# Patient Record
Sex: FEMALE | Race: WHITE | Employment: FULL TIME | URBAN - METROPOLITAN AREA
[De-identification: names, ages, dates, MRNs, and addresses within clinical notes are randomized per-mention and may not be internally consistent; named-entity substitution may affect disease eponyms.]

---

## 2022-05-29 ENCOUNTER — HOSPITAL ENCOUNTER (EMERGENCY)
Age: 27
Discharge: HOME | End: 2022-05-29
Attending: EMERGENCY MEDICINE
Payer: COMMERCIAL

## 2022-05-29 ENCOUNTER — APPOINTMENT (OUTPATIENT)
Dept: CT IMAGING | Age: 27
End: 2022-05-29
Payer: COMMERCIAL

## 2022-05-29 VITALS
SYSTOLIC BLOOD PRESSURE: 119 MMHG | HEIGHT: 66 IN | OXYGEN SATURATION: 100 % | RESPIRATION RATE: 18 BRPM | BODY MASS INDEX: 34.65 KG/M2 | TEMPERATURE: 98 F | HEART RATE: 64 BPM | WEIGHT: 215.61 LBS | DIASTOLIC BLOOD PRESSURE: 79 MMHG

## 2022-05-29 DIAGNOSIS — M54.50 ACUTE EXACERBATION OF CHRONIC LOW BACK PAIN: Primary | ICD-10-CM

## 2022-05-29 DIAGNOSIS — G89.29 ACUTE EXACERBATION OF CHRONIC LOW BACK PAIN: Primary | ICD-10-CM

## 2022-05-29 LAB
BACTERIA: ABNORMAL /HPF
BILIRUBIN URINE: NEGATIVE
BLOOD, URINE: ABNORMAL
CLARITY: ABNORMAL
COLOR: YELLOW
EPITHELIAL CELLS, UA: 9 /HPF (ref 0–5)
GLUCOSE URINE: NEGATIVE MG/DL
HCG(URINE) PREGNANCY TEST: NEGATIVE
HYALINE CASTS: 1 /LPF (ref 0–8)
KETONES, URINE: 15 MG/DL
LEUKOCYTE ESTERASE, URINE: NEGATIVE
MICROSCOPIC EXAMINATION: YES
NITRITE, URINE: NEGATIVE
PH UA: 5.5 (ref 5–8)
PROTEIN UA: NEGATIVE MG/DL
RBC UA: 14 /HPF (ref 0–4)
SPECIFIC GRAVITY UA: 1.03 (ref 1–1.03)
URINE REFLEX TO CULTURE: ABNORMAL
URINE TYPE: ABNORMAL
UROBILINOGEN, URINE: 1 E.U./DL
WBC UA: 3 /HPF (ref 0–5)

## 2022-05-29 PROCEDURE — 96372 THER/PROPH/DIAG INJ SC/IM: CPT

## 2022-05-29 PROCEDURE — 6360000002 HC RX W HCPCS: Performed by: EMERGENCY MEDICINE

## 2022-05-29 PROCEDURE — 84703 CHORIONIC GONADOTROPIN ASSAY: CPT

## 2022-05-29 PROCEDURE — 72131 CT LUMBAR SPINE W/O DYE: CPT

## 2022-05-29 PROCEDURE — 81001 URINALYSIS AUTO W/SCOPE: CPT

## 2022-05-29 PROCEDURE — 99284 EMERGENCY DEPT VISIT MOD MDM: CPT

## 2022-05-29 RX ORDER — KETOROLAC TROMETHAMINE 30 MG/ML
30 INJECTION, SOLUTION INTRAMUSCULAR; INTRAVENOUS ONCE
Status: COMPLETED | OUTPATIENT
Start: 2022-05-29 | End: 2022-05-29

## 2022-05-29 RX ORDER — METHYLPREDNISOLONE 4 MG/1
TABLET ORAL
Qty: 1 KIT | Refills: 0 | Status: SHIPPED | OUTPATIENT
Start: 2022-05-29

## 2022-05-29 RX ORDER — KETOROLAC TROMETHAMINE 10 MG/1
10 TABLET, FILM COATED ORAL EVERY 6 HOURS PRN
Qty: 14 TABLET | Refills: 0 | Status: SHIPPED | OUTPATIENT
Start: 2022-05-29

## 2022-05-29 RX ADMIN — KETOROLAC TROMETHAMINE 30 MG: 30 INJECTION, SOLUTION INTRAMUSCULAR at 06:29

## 2022-05-29 ASSESSMENT — PAIN SCALES - GENERAL: PAINLEVEL_OUTOF10: 8

## 2022-05-29 ASSESSMENT — PAIN DESCRIPTION - LOCATION: LOCATION: BACK

## 2022-05-29 ASSESSMENT — ENCOUNTER SYMPTOMS
ABDOMINAL PAIN: 0
EYE ITCHING: 0
EYE DISCHARGE: 0
COLOR CHANGE: 0
COUGH: 0
CONSTIPATION: 0
VOMITING: 0
SHORTNESS OF BREATH: 0
BACK PAIN: 1

## 2022-05-29 ASSESSMENT — PAIN - FUNCTIONAL ASSESSMENT: PAIN_FUNCTIONAL_ASSESSMENT: 0-10

## 2022-05-30 NOTE — ED PROVIDER NOTES
[facility]      Pt Name: Tammi Ríos  MRN: [id number]  [place] 1995  Date of evaluation: 5/29/2022  Provider: Raine Elliott MD    [address] [place] [address]       Chief Complaint   Patient presents with    Back Pain     pt states that her back pain has gotten worst since last week back injury in 2017       [address] is a 32 y.o. female who presents to the emergency department with back pain. Patient endorses lower right back pain radiating down the right leg. History of sciatica. Denies falls or trauma. No weakness, saddle numbness, loss of bowel or bladder function. Pain currently 9 out of 10 achy nature. Improved with NSAIDs. No urinary complaints. No other associated symptoms. Pain is worse with movement. Better with rest.  Started spontaneously a few days ago. Nursing Notes were reviewed. Including nursing noted for FM, Surgical History, Past Medical History, Social History, vitals, and allergies; agree with all. REVIEW OF SYSTEMS       Review of Systems   Constitutional: Negative for diaphoresis and unexpected weight change. HENT: Negative for congestion and dental problem. Eyes: Negative for discharge and itching. Respiratory: Negative for cough and shortness of breath. Cardiovascular: Negative for chest pain and leg swelling. Gastrointestinal: Negative for abdominal pain, constipation and vomiting. Endocrine: Negative for cold intolerance and heat intolerance. Genitourinary: Negative for vaginal bleeding, vaginal discharge and vaginal pain. Musculoskeletal: Positive for back pain. Negative for neck pain and neck stiffness. Skin: Negative for color change and pallor. Neurological: Negative for tremors and weakness. Psychiatric/Behavioral: Negative for agitation and behavioral problems.        Except as noted above the remainder of the review of systems was reviewed and negative. PAST MEDICAL HISTORY   History reviewed. No pertinent past medical history. SURGICAL HISTORY     History reviewed. No pertinent surgical history. CURRENT MEDICATIONS       Discharge Medication List as of 5/29/2022  6:53 AM          ALLERGIES     Patient has no known allergies. FAMILY HISTORY      History reviewed. No pertinent family history. SOCIAL HISTORY       Social History     Socioeconomic History    Marital status: Single     Spouse name: None    Number of children: None    Years of education: None    Highest education level: None   Occupational History    None   Tobacco Use    Smoking status: Never Smoker    Smokeless tobacco: Never Used   Substance and Sexual Activity    Alcohol use: Not Currently    Drug use: Yes     Types: Marijuana Donna Dinning)    Sexual activity: None   Other Topics Concern    None   Social History Narrative    None     Social Determinants of Health     Financial Resource Strain:     Difficulty of Paying Living Expenses: Not on file   Food Insecurity:     Worried About Running Out of Food in the Last Year: Not on file    Carolyn of Food in the Last Year: Not on file   Transportation Needs:     Lack of Transportation (Medical): Not on file    Lack of Transportation (Non-Medical):  Not on file   Physical Activity:     Days of Exercise per Week: Not on file    Minutes of Exercise per Session: Not on file   Stress:     Feeling of Stress : Not on file   Social Connections:     Frequency of Communication with Friends and Family: Not on file    Frequency of Social Gatherings with Friends and Family: Not on file    Attends Taoism Services: Not on file    Active Member of Clubs or Organizations: Not on file    Attends Club or Organization Meetings: Not on file    Marital Status: Not on file   Intimate Partner Violence:     Fear of Current or Ex-Partner: Not on file    Emotionally Abused: Not on file    Physically Abused: Not on file   [facility] Sexually Abused: Not on file   Housing Stability:     Unable to Pay for Housing in the Last Year: Not on file    Number of Places Lived in the Last Year: Not on file    Unstable Housing in the Last Year: Not on file       PHYSICAL EXAM       ED Triage Vitals [05/29/22 0459]   BP Temp Temp Source Heart Rate Resp SpO2 Height Weight   119/79 98 °F (36.7 °C) Oral 64 18 100 % 5' 6\" (1.676 m) 215 lb 9.8 oz (97.8 kg)       Physical Exam  Vitals and nursing note reviewed. Constitutional:       General: She is not in acute distress. Appearance: She is well-developed. She is not ill-appearing, toxic-appearing or diaphoretic. HENT:      Head: Normocephalic and atraumatic. Right Ear: External ear normal.      Left Ear: External ear normal.   Eyes:      General:         Right eye: No discharge. Left eye: No discharge. Conjunctiva/sclera: Conjunctivae normal.      Pupils: Pupils are equal, round, and reactive to light. Cardiovascular:      Rate and Rhythm: Normal rate and regular rhythm. Heart sounds: No murmur heard. Pulmonary:      Effort: Pulmonary effort is normal. No respiratory distress. Breath sounds: Normal breath sounds. No wheezing or rales. Abdominal:      General: Bowel sounds are normal. There is no distension. Palpations: Abdomen is soft. There is no mass. Tenderness: There is no abdominal tenderness. There is no guarding or rebound. Genitourinary:     Comments: Deferred  Musculoskeletal:         General: No deformity. Normal range of motion. Cervical back: Normal range of motion and neck supple. Skin:     General: Skin is warm. Findings: No erythema or rash. Neurological:      Mental Status: She is alert and oriented to person, place, and time. She is not disoriented. Cranial Nerves: No cranial nerve deficit. Motor: No atrophy or abnormal muscle tone.       Coordination: Coordination normal.   Psychiatric:         Behavior: Behavior normal.         Thought Content: Thought content normal.         DIAGNOSTIC RESULTS     RADIOLOGY:   Non-plain film images such as CT, Ultrasoundand MRI are read by the radiologist. Plain radiographic images are visualized and preliminarily interpreted by the emergency physician with the below findings:    Impression   Degenerative change in lumbar spine as described most pronounced at L5-S1. Karla San No acute osseous abnormality. ED BEDSIDE ULTRASOUND:   Performed by ED Physician - none    LABS:  Labs Reviewed   URINALYSIS WITH REFLEX TO CULTURE - Abnormal; Notable for the following components:       Result Value    Clarity, UA CLOUDY (*)     Ketones, Urine 15 (*)     Blood, Urine LARGE (*)     All other components within normal limits   MICROSCOPIC URINALYSIS - Abnormal; Notable for the following components:    Bacteria, UA 1+ (*)     RBC, UA 14 (*)     Epithelial Cells, UA 9 (*)     All other components within normal limits   PREGNANCY, URINE       All other labs were withinnormal range or not returned as of this dictation. EMERGENCY DEPARTMENT COURSE and DIFFERENTIAL DIAGNOSIS/MDM:     PMH, Surgical Hx, FH, Social Hx reviewed by myself (ETOH usage, Tobacco usage, Drug usage reviewed by myself, no pertinent Hx)- No Pertinent Hx     Old records were reviewed by me    61-year-old female with back pain. No cord compression symptoms. Discussed CT findings. Outpatient follow-up with spine. I estimate there is LOW risk for Sepsis, MI, Stroke, Tamponade, PTX, Toxicity or other life threatening etiology thus I consider the discharge disposition reasonable. The patient is at low risk for mortality based on demographic, history and clinical factors. Given the best available information and clinical assessment, I estimate the risk of hospitalization to be greater than risk of treatment at home. I have explained to the patient that the risk could rapidly change, given precautions for return and instructions. Explained to patient that the risk for mortality is low based on demographic, history and clinical factors. I discussed with patient the results of evaluation in the ED, diagnosis, care, and prognosis. The plan is to discharge to home. Patient is in agreement with plan and questions have been answered. I also discussed with patient the reasons which may require a return visit and the importance of follow-up care. The patient is well-appearing, nontoxic, and improved at the time of discharge. Patient agrees to call to arrange follow-up care as directed. Patient understands to return immediately for worsening/change in symptoms. CRITICAL CARE TIME   Total Critical Caretime was 21 minutes, excluding separately reportable procedures. There was a high probability of clinically significant/life threatening deterioration in the patient's condition which required my urgent intervention. PROCEDURES:  Unlessotherwise noted below, none    FINAL IMPRESSION      1.  Acute exacerbation of chronic low back pain          DISPOSITION/PLAN   DISPOSITION Decision To Discharge 05/29/2022 06:51:09 AM    PATIENT REFERRED TO:  [place] [address]  [phone]    Call today        DISCHARGE MEDICATIONS:  Discharge Medication List as of 5/29/2022  6:53 AM      START taking these medications    Details   methylPREDNISolone (MEDROL, WILL,) 4 MG tablet Take by mouth., Disp-1 kit, R-0Print      ketorolac (TORADOL) 10 MG tablet Take 1 tablet by mouth every 6 hours as needed for Pain, Disp-14 tablet, R-0Print                (Please note that portions ofthis note were completed with a voice recognition program.  Efforts were made to edit the dictations but occasionally words are mis-transcribed.)    Gerry Finn MD(electronically signed)  Attending Emergency Physician        Gerry Finn MD  05/29/22 Nesha Coulter MD  05/29/22 6128

## 2024-06-09 ENCOUNTER — HOSPITAL ENCOUNTER (EMERGENCY)
Facility: HOSPITAL | Age: 29
Discharge: HOME | End: 2024-06-09
Attending: EMERGENCY MEDICINE | Admitting: EMERGENCY MEDICINE
Payer: MEDICAID

## 2024-06-09 VITALS
SYSTOLIC BLOOD PRESSURE: 123 MMHG | OXYGEN SATURATION: 99 % | HEIGHT: 66 IN | WEIGHT: 200 LBS | RESPIRATION RATE: 18 BRPM | TEMPERATURE: 98.4 F | DIASTOLIC BLOOD PRESSURE: 80 MMHG | HEART RATE: 65 BPM | BODY MASS INDEX: 32.14 KG/M2

## 2024-06-09 DIAGNOSIS — F41.0 GENERALIZED ANXIETY DISORDER WITH PANIC ATTACKS: Primary | ICD-10-CM

## 2024-06-09 DIAGNOSIS — F41.1 GENERALIZED ANXIETY DISORDER WITH PANIC ATTACKS: Primary | ICD-10-CM

## 2024-06-09 LAB
ALBUMIN SERPL-MCNC: 3.9 G/DL (ref 3.5–5.2)
ALBUMIN/GLOB SERPL: 2 G/DL
ALP SERPL-CCNC: 69 U/L (ref 39–117)
ALT SERPL W P-5'-P-CCNC: 16 U/L (ref 1–33)
ANION GAP SERPL CALCULATED.3IONS-SCNC: 10 MMOL/L (ref 5–15)
AST SERPL-CCNC: 18 U/L (ref 1–32)
BASOPHILS # BLD AUTO: 0.05 10*3/MM3 (ref 0–0.2)
BASOPHILS NFR BLD AUTO: 0.8 % (ref 0–1.5)
BILIRUB SERPL-MCNC: <0.2 MG/DL (ref 0–1.2)
BUN SERPL-MCNC: 7 MG/DL (ref 6–20)
BUN/CREAT SERPL: 10.3 (ref 7–25)
CALCIUM SPEC-SCNC: 9.2 MG/DL (ref 8.6–10.5)
CHLORIDE SERPL-SCNC: 105 MMOL/L (ref 98–107)
CO2 SERPL-SCNC: 24 MMOL/L (ref 22–29)
CREAT SERPL-MCNC: 0.68 MG/DL (ref 0.57–1)
DEPRECATED RDW RBC AUTO: 41.5 FL (ref 37–54)
EGFRCR SERPLBLD CKD-EPI 2021: 121.1 ML/MIN/1.73
EOSINOPHIL # BLD AUTO: 0.18 10*3/MM3 (ref 0–0.4)
EOSINOPHIL NFR BLD AUTO: 2.8 % (ref 0.3–6.2)
ERYTHROCYTE [DISTWIDTH] IN BLOOD BY AUTOMATED COUNT: 12.6 % (ref 12.3–15.4)
GLOBULIN UR ELPH-MCNC: 2 GM/DL
GLUCOSE SERPL-MCNC: 103 MG/DL (ref 65–99)
HCT VFR BLD AUTO: 38.7 % (ref 34–46.6)
HGB BLD-MCNC: 12.8 G/DL (ref 12–15.9)
IMM GRANULOCYTES # BLD AUTO: 0.04 10*3/MM3 (ref 0–0.05)
IMM GRANULOCYTES NFR BLD AUTO: 0.6 % (ref 0–0.5)
LYMPHOCYTES # BLD AUTO: 1.71 10*3/MM3 (ref 0.7–3.1)
LYMPHOCYTES NFR BLD AUTO: 26.5 % (ref 19.6–45.3)
MCH RBC QN AUTO: 29.6 PG (ref 26.6–33)
MCHC RBC AUTO-ENTMCNC: 33.1 G/DL (ref 31.5–35.7)
MCV RBC AUTO: 89.4 FL (ref 79–97)
MONOCYTES # BLD AUTO: 0.61 10*3/MM3 (ref 0.1–0.9)
MONOCYTES NFR BLD AUTO: 9.5 % (ref 5–12)
NEUTROPHILS NFR BLD AUTO: 3.86 10*3/MM3 (ref 1.7–7)
NEUTROPHILS NFR BLD AUTO: 59.8 % (ref 42.7–76)
NRBC BLD AUTO-RTO: 0 /100 WBC (ref 0–0.2)
PLATELET # BLD AUTO: 267 10*3/MM3 (ref 140–450)
PMV BLD AUTO: 10.9 FL (ref 6–12)
POTASSIUM SERPL-SCNC: 3.6 MMOL/L (ref 3.5–5.2)
PROT SERPL-MCNC: 5.9 G/DL (ref 6–8.5)
RBC # BLD AUTO: 4.33 10*6/MM3 (ref 3.77–5.28)
SODIUM SERPL-SCNC: 139 MMOL/L (ref 136–145)
WBC NRBC COR # BLD AUTO: 6.45 10*3/MM3 (ref 3.4–10.8)

## 2024-06-09 PROCEDURE — 99283 EMERGENCY DEPT VISIT LOW MDM: CPT

## 2024-06-09 PROCEDURE — 85025 COMPLETE CBC W/AUTO DIFF WBC: CPT | Performed by: EMERGENCY MEDICINE

## 2024-06-09 PROCEDURE — 80053 COMPREHEN METABOLIC PANEL: CPT | Performed by: EMERGENCY MEDICINE

## 2024-06-09 PROCEDURE — 36415 COLL VENOUS BLD VENIPUNCTURE: CPT

## 2024-06-09 RX ORDER — HYDROXYZINE HYDROCHLORIDE 25 MG/1
50 TABLET, FILM COATED ORAL ONCE
Status: COMPLETED | OUTPATIENT
Start: 2024-06-09 | End: 2024-06-09

## 2024-06-09 RX ORDER — HYDROXYZINE HYDROCHLORIDE 25 MG/1
25 TABLET, FILM COATED ORAL 3 TIMES DAILY PRN
Qty: 9 TABLET | Refills: 0 | Status: SHIPPED | OUTPATIENT
Start: 2024-06-09 | End: 2024-06-12

## 2024-06-09 RX ORDER — BUSPIRONE HYDROCHLORIDE 5 MG/1
5 TABLET ORAL 3 TIMES DAILY
COMMUNITY

## 2024-06-09 RX ORDER — PAROXETINE HYDROCHLORIDE 20 MG/1
20 TABLET, FILM COATED ORAL EVERY MORNING
COMMUNITY

## 2024-06-09 RX ADMIN — HYDROXYZINE HYDROCHLORIDE 50 MG: 25 TABLET, FILM COATED ORAL at 04:38

## 2024-06-09 NOTE — ED PROVIDER NOTES
Subjective   History of Present Illness  Patient presents for evaluation of symptoms of anxiety, tremulousness, GI upset described as some mild crampy upper abdominal pain and nausea.  Patient states that she was recently treated as an inpatient psychiatric hospital and was started on new medications BuSpar and Paxil and is worried she may be developing serotonin syndrome.    History provided by:  Patient      Review of Systems    Past Medical History:   Diagnosis Date    Anxiety     Depression        Allergies   Allergen Reactions    Venlafaxine Palpitations       History reviewed. No pertinent surgical history.    History reviewed. No pertinent family history.    Social History     Socioeconomic History    Marital status: Single   Tobacco Use    Smoking status: Never   Vaping Use    Vaping status: Never Used   Substance and Sexual Activity    Alcohol use: Never    Drug use: Never           Objective   Physical Exam  Constitutional:       General: She is not in acute distress.  HENT:      Head: Normocephalic and atraumatic.   Eyes:      Conjunctiva/sclera: Conjunctivae normal.      Pupils: Pupils are equal, round, and reactive to light.   Cardiovascular:      Rate and Rhythm: Normal rate and regular rhythm.      Pulses: Normal pulses.      Heart sounds: No murmur heard.     No gallop.   Pulmonary:      Effort: Pulmonary effort is normal. No respiratory distress.   Abdominal:      General: Abdomen is flat. There is no distension.      Tenderness: There is no abdominal tenderness.   Musculoskeletal:         General: No swelling or deformity. Normal range of motion.   Skin:     General: Skin is warm and dry.      Capillary Refill: Capillary refill takes less than 2 seconds.   Neurological:      General: No focal deficit present.      Mental Status: She is alert and oriented to person, place, and time.      Comments: GCS 15.  Cranial Nerves II-XII intact without deficit.  Strength 5/5 in the bilateral upper  extremities.  Strength 5/5 in the bilateral lower extremities.  Sensation to light touch intact throughout.  Cerebellar function intact via finger-nose-finger.  There is a resting tremor of the bilateral upper extremities.  There is no clonus.   Psychiatric:         Mood and Affect: Mood is anxious.         Behavior: Behavior is hyperactive. Behavior is cooperative.         Procedures           ED Course  ED Course as of 06/09/24 0628   Sun Jun 09, 2024 0628 Laboratory workup independently interpreted by myself demonstrate no significant abnormalities [KB]      ED Course User Index  [KB] Robb Stein MD                                             Medical Decision Making  At this time I believe patient is most likely suffering from anxiety and panic attack.  She does not meet any criteria suggestive of serotonin syndrome.  Patient was given oral Atarax 50 mg, screening labs obtained and patient was reassessed.    On reassessment patient is feeling improved.  She continues to be overall well-appearing and has normal vital signs.  I continue to believe that a underlying anxiety and panic attack is the most likely cause of patient's symptoms.  She was given a as needed prescription for hydroxyzine, will follow-up with her mental health team and she was discharged from the ER in good condition    Problems Addressed:  Generalized anxiety disorder with panic attacks: complicated acute illness or injury    Amount and/or Complexity of Data Reviewed  Labs: ordered. Decision-making details documented in ED Course.    Risk  Prescription drug management.        Final diagnoses:   Generalized anxiety disorder with panic attacks       ED Disposition  ED Disposition       ED Disposition   Discharge    Condition   Stable    Comment   --             Recent Results (from the past 24 hour(s))   Comprehensive Metabolic Panel    Collection Time: 06/09/24  4:39 AM    Specimen: Blood   Result Value Ref Range    Glucose 103 (H) 65 - 99  mg/dL    BUN 7 6 - 20 mg/dL    Creatinine 0.68 0.57 - 1.00 mg/dL    Sodium 139 136 - 145 mmol/L    Potassium 3.6 3.5 - 5.2 mmol/L    Chloride 105 98 - 107 mmol/L    CO2 24.0 22.0 - 29.0 mmol/L    Calcium 9.2 8.6 - 10.5 mg/dL    Total Protein 5.9 (L) 6.0 - 8.5 g/dL    Albumin 3.9 3.5 - 5.2 g/dL    ALT (SGPT) 16 1 - 33 U/L    AST (SGOT) 18 1 - 32 U/L    Alkaline Phosphatase 69 39 - 117 U/L    Total Bilirubin <0.2 0.0 - 1.2 mg/dL    Globulin 2.0 gm/dL    A/G Ratio 2.0 g/dL    BUN/Creatinine Ratio 10.3 7.0 - 25.0    Anion Gap 10.0 5.0 - 15.0 mmol/L    eGFR 121.1 >60.0 mL/min/1.73   CBC Auto Differential    Collection Time: 06/09/24  4:39 AM    Specimen: Blood   Result Value Ref Range    WBC 6.45 3.40 - 10.80 10*3/mm3    RBC 4.33 3.77 - 5.28 10*6/mm3    Hemoglobin 12.8 12.0 - 15.9 g/dL    Hematocrit 38.7 34.0 - 46.6 %    MCV 89.4 79.0 - 97.0 fL    MCH 29.6 26.6 - 33.0 pg    MCHC 33.1 31.5 - 35.7 g/dL    RDW 12.6 12.3 - 15.4 %    RDW-SD 41.5 37.0 - 54.0 fl    MPV 10.9 6.0 - 12.0 fL    Platelets 267 140 - 450 10*3/mm3    Neutrophil % 59.8 42.7 - 76.0 %    Lymphocyte % 26.5 19.6 - 45.3 %    Monocyte % 9.5 5.0 - 12.0 %    Eosinophil % 2.8 0.3 - 6.2 %    Basophil % 0.8 0.0 - 1.5 %    Immature Grans % 0.6 (H) 0.0 - 0.5 %    Neutrophils, Absolute 3.86 1.70 - 7.00 10*3/mm3    Lymphocytes, Absolute 1.71 0.70 - 3.10 10*3/mm3    Monocytes, Absolute 0.61 0.10 - 0.90 10*3/mm3    Eosinophils, Absolute 0.18 0.00 - 0.40 10*3/mm3    Basophils, Absolute 0.05 0.00 - 0.20 10*3/mm3    Immature Grans, Absolute 0.04 0.00 - 0.05 10*3/mm3    nRBC 0.0 0.0 - 0.2 /100 WBC     Note: In addition to lab results from this visit, the labs listed above may include labs taken at another facility or during a different encounter within the last 24 hours. Please correlate lab times with ED admission and discharge times for further clarification of the services performed during this visit.    No orders to display     Vitals:    06/09/24 0336 06/09/24 0430  "06/09/24 0500 06/09/24 0530   BP: 148/88 107/76 119/90 123/80   Pulse: 83 72 70 65   Resp: 18 18     Temp: 98.4 °F (36.9 °C)      TempSrc: Oral      SpO2: 99% 96% 97% 99%   Weight: 90.7 kg (200 lb)      Height: 167.6 cm (66\")        Medications   hydrOXYzine (ATARAX) tablet 50 mg (50 mg Oral Given 6/9/24 0438)     ECG/EMG Results (last 24 hours)       ** No results found for the last 24 hours. **          No orders to display           PATIENT CONNECTION - [place] [id number]  [phone]  Call in 1 day           Medication List        New Prescriptions      hydrOXYzine 25 MG tablet  Commonly known as: ATARAX  Take 1 tablet by mouth 3 (Three) Times a Day As Needed for Anxiety for up to 3 days.               Where to Get Your Medications        These medications were sent to [facility]/pharmacy #[id number] - [place], [place] - [address] [facility] [phone] [facility] [phone]   [address] [facility] [address]      Hours: 24-hours Phone: [phone]   hydrOXYzine 25 MG tablet            Robb Stein MD  06/09/24 0628    "

## 2024-06-09 NOTE — DISCHARGE INSTRUCTIONS
Take prescribed medication Atarax for episodes of severe anxiety.  Follow-up with your mental health provider.  Return to the ER as needed for new or worsening symptoms.    Call the patient connection line in this paperwork to help establish primary care follow-up

## 2024-06-28 PROCEDURE — 87086 URINE CULTURE/COLONY COUNT: CPT | Performed by: NURSE PRACTITIONER

## 2024-07-02 ENCOUNTER — TELEPHONE (OUTPATIENT)
Dept: FAMILY MEDICINE CLINIC | Facility: CLINIC | Age: 29
End: 2024-07-02

## 2024-07-02 ENCOUNTER — OFFICE VISIT (OUTPATIENT)
Dept: FAMILY MEDICINE CLINIC | Facility: CLINIC | Age: 29
End: 2024-07-02
Payer: MEDICAID

## 2024-07-02 ENCOUNTER — LAB (OUTPATIENT)
Dept: LAB | Facility: HOSPITAL | Age: 29
End: 2024-07-02
Payer: MEDICAID

## 2024-07-02 VITALS
WEIGHT: 210.2 LBS | SYSTOLIC BLOOD PRESSURE: 122 MMHG | OXYGEN SATURATION: 98 % | HEART RATE: 85 BPM | BODY MASS INDEX: 33.78 KG/M2 | HEIGHT: 66 IN | DIASTOLIC BLOOD PRESSURE: 84 MMHG

## 2024-07-02 DIAGNOSIS — F41.9 ANXIETY: Primary | ICD-10-CM

## 2024-07-02 DIAGNOSIS — G56.02 CARPAL TUNNEL SYNDROME OF LEFT WRIST: ICD-10-CM

## 2024-07-02 DIAGNOSIS — K92.1 BLOOD IN STOOL: ICD-10-CM

## 2024-07-02 DIAGNOSIS — R20.2 PARESTHESIAS: ICD-10-CM

## 2024-07-02 DIAGNOSIS — R35.81 NOCTURNAL POLYURIA: ICD-10-CM

## 2024-07-02 DIAGNOSIS — F41.0 PANIC DISORDER: ICD-10-CM

## 2024-07-02 DIAGNOSIS — R55 SYNCOPE, UNSPECIFIED SYNCOPE TYPE: ICD-10-CM

## 2024-07-02 PROBLEM — M51.369 DEGENERATION OF LUMBAR INTERVERTEBRAL DISC: Status: ACTIVE | Noted: 2022-12-06

## 2024-07-02 PROBLEM — M51.36 DEGENERATION OF LUMBAR INTERVERTEBRAL DISC: Status: ACTIVE | Noted: 2022-12-06

## 2024-07-02 LAB
ALBUMIN SERPL-MCNC: 4.6 G/DL (ref 3.5–5.2)
ALBUMIN/GLOB SERPL: 1.7 G/DL
ALP SERPL-CCNC: 80 U/L (ref 39–117)
ALT SERPL W P-5'-P-CCNC: 13 U/L (ref 1–33)
ANION GAP SERPL CALCULATED.3IONS-SCNC: 10 MMOL/L (ref 5–15)
AST SERPL-CCNC: 20 U/L (ref 1–32)
BASOPHILS # BLD AUTO: 0.06 10*3/MM3 (ref 0–0.2)
BASOPHILS NFR BLD AUTO: 0.6 % (ref 0–1.5)
BILIRUB SERPL-MCNC: 0.2 MG/DL (ref 0–1.2)
BUN SERPL-MCNC: 11 MG/DL (ref 6–20)
BUN/CREAT SERPL: 16.7 (ref 7–25)
CALCIUM SPEC-SCNC: 9.8 MG/DL (ref 8.6–10.5)
CHLORIDE SERPL-SCNC: 103 MMOL/L (ref 98–107)
CO2 SERPL-SCNC: 27 MMOL/L (ref 22–29)
CREAT SERPL-MCNC: 0.66 MG/DL (ref 0.57–1)
DEPRECATED RDW RBC AUTO: 39.8 FL (ref 37–54)
EGFRCR SERPLBLD CKD-EPI 2021: 122 ML/MIN/1.73
EOSINOPHIL # BLD AUTO: 0.09 10*3/MM3 (ref 0–0.4)
EOSINOPHIL NFR BLD AUTO: 0.9 % (ref 0.3–6.2)
ERYTHROCYTE [DISTWIDTH] IN BLOOD BY AUTOMATED COUNT: 12.1 % (ref 12.3–15.4)
FOLATE SERPL-MCNC: 8.86 NG/ML (ref 4.78–24.2)
GLOBULIN UR ELPH-MCNC: 2.7 GM/DL
GLUCOSE SERPL-MCNC: 91 MG/DL (ref 65–99)
HBA1C MFR BLD: 5.1 % (ref 4.8–5.6)
HCT VFR BLD AUTO: 45.4 % (ref 34–46.6)
HGB BLD-MCNC: 14.8 G/DL (ref 12–15.9)
IMM GRANULOCYTES # BLD AUTO: 0.03 10*3/MM3 (ref 0–0.05)
IMM GRANULOCYTES NFR BLD AUTO: 0.3 % (ref 0–0.5)
LYMPHOCYTES # BLD AUTO: 1.76 10*3/MM3 (ref 0.7–3.1)
LYMPHOCYTES NFR BLD AUTO: 17.6 % (ref 19.6–45.3)
MCH RBC QN AUTO: 29.7 PG (ref 26.6–33)
MCHC RBC AUTO-ENTMCNC: 32.6 G/DL (ref 31.5–35.7)
MCV RBC AUTO: 91 FL (ref 79–97)
MONOCYTES # BLD AUTO: 0.65 10*3/MM3 (ref 0.1–0.9)
MONOCYTES NFR BLD AUTO: 6.5 % (ref 5–12)
NEUTROPHILS NFR BLD AUTO: 7.41 10*3/MM3 (ref 1.7–7)
NEUTROPHILS NFR BLD AUTO: 74.1 % (ref 42.7–76)
NRBC BLD AUTO-RTO: 0 /100 WBC (ref 0–0.2)
PLATELET # BLD AUTO: 338 10*3/MM3 (ref 140–450)
PMV BLD AUTO: 11 FL (ref 6–12)
POTASSIUM SERPL-SCNC: 3.9 MMOL/L (ref 3.5–5.2)
PROT SERPL-MCNC: 7.3 G/DL (ref 6–8.5)
RBC # BLD AUTO: 4.99 10*6/MM3 (ref 3.77–5.28)
SODIUM SERPL-SCNC: 140 MMOL/L (ref 136–145)
T4 FREE SERPL-MCNC: 1.09 NG/DL (ref 0.92–1.68)
TSH SERPL DL<=0.05 MIU/L-ACNC: 2.03 UIU/ML (ref 0.27–4.2)
VIT B12 BLD-MCNC: 469 PG/ML (ref 211–946)
WBC NRBC COR # BLD AUTO: 10 10*3/MM3 (ref 3.4–10.8)

## 2024-07-02 PROCEDURE — 82607 VITAMIN B-12: CPT

## 2024-07-02 PROCEDURE — 80053 COMPREHEN METABOLIC PANEL: CPT

## 2024-07-02 PROCEDURE — 36415 COLL VENOUS BLD VENIPUNCTURE: CPT

## 2024-07-02 PROCEDURE — 99214 OFFICE O/P EST MOD 30 MIN: CPT

## 2024-07-02 PROCEDURE — 82746 ASSAY OF FOLIC ACID SERUM: CPT

## 2024-07-02 PROCEDURE — 83036 HEMOGLOBIN GLYCOSYLATED A1C: CPT

## 2024-07-02 PROCEDURE — 84439 ASSAY OF FREE THYROXINE: CPT

## 2024-07-02 PROCEDURE — 85025 COMPLETE CBC W/AUTO DIFF WBC: CPT

## 2024-07-02 PROCEDURE — 84443 ASSAY THYROID STIM HORMONE: CPT

## 2024-07-02 RX ORDER — BUSPIRONE HYDROCHLORIDE 5 MG/1
5 TABLET ORAL 3 TIMES DAILY
Qty: 90 TABLET | Refills: 1 | Status: SHIPPED | OUTPATIENT
Start: 2024-07-02

## 2024-07-02 RX ORDER — PAROXETINE HYDROCHLORIDE 20 MG/1
20 TABLET, FILM COATED ORAL EVERY MORNING
Qty: 90 TABLET | Refills: 1 | Status: SHIPPED | OUTPATIENT
Start: 2024-07-02

## 2024-07-02 NOTE — PROGRESS NOTES
"     New Patient Office Visit      Date: 2024   Patient Name: Caitlin Chaves  : 1995   MRN: [id number]     Chief Complaint:    Chief Complaint   Patient presents with    [facility]     Pt believes to have had reactions to anti-biotic medications, came into urgent care last Friday for UTI due to frequent urination was prescribes anti-biotics making pt exhausted and hard to concentrate, experiencing migraines       Numbness     Left index finger has had numbness since last Friday     Panic Attack     Reoccurring panic attacks when overwhelmed has experienced fainting before        History of Present Illness: Caitlin Chaves is a 29 y.o. female who is here today to establish care.   Originally from [facility]. Moved from [place] about 1 year ago. Not currently employed.   Patient has a past medical history of PTSD, anxiety, panic disorder and depression.  Patient is that she recently got out added inpatient services at [facility] for psychiatric care.  She states she was restarted on her 5 mg BuSpar 3 times daily and Paxil.  Patient states she has been taking half of her 20 mg Paxil.  She states that she has a history of reacting \"strongly\" to psychiatric medications so they like to start her out slow.  She thinks that it was helping when she first started medication back, but feels that it is not helping as much as it previously was.  Denies SI or HI.  She states that her depression and anxiety symptoms have been worse in the past couple of days.  She is interested in increasing to the full 20 mg dose of Paxil.  Patient states she has previously been on and failed Lexapro, venlafaxine and Prozac due to medication side effects.    Patient does have a history of panic disorder as well.  She states that the BuSpar has helped with the panic disorder.  Patient states in  she had 2 episodes of panic attacks related to stress of the pandemic and just general life that caused her " "to feel \"tingly all over and passed out.\"  Patient states that this happened once this year, but has not been as frequent as it was in 2020.  Patient states she is concerned that she may have nonepileptic seizures.  She states that she thinks it could also be related to anxiety.  She states that before this episode has happened before she will feel a \"tingling in her scalp\" and then she will pass out.  She states that people have told her she looks like she is having a seizure.\"  She states that sometimes she is conscious for these episodes.  Denies chest pain, palpitations, shortness of breath.  States they are mostly correlated with when she is having a lot of anxiety.  She is requesting to see neurology for this.  Denies dizziness or lightheadedness currently.  Denies visual changes.      UTI: Started Macrobid for UTI on Friday. Felt \"very drowsy\" and had migraine reaction. Was told to complete the antibiotic, but she did not have infection. She stopped antibiotic. Has had increased urination in the past year or so. Not dysuria or flank pain. She does have diabetes in her family history but states she has bee tested before and she did not have diabetes or prediabetes.  She states it has been a while though.  She states she does drink water up until the point of going to bed and drinks a lot of water.  She states that she will wake up in the night and have to urinate frequently.  She states she has also had recurrent UTIs in the past year and wants to see urology specifically.    Patient had an episode of blood in her stool in March of this year.  Patient states that she does have a history of constipation and diarrhea alternating secondary to her anxiety.  Patient states that she will occasionally have blood with firm bowel movements after wiping.  She states this does not happen daily.  She denies dizziness or lightheadedness.  She denies blood mixed in with her stool.  States that it usually bright red blood " "and not black or tarry.  Denies abdominal pain or rectal pain.  She states when she was in the ER this year for the blood they did check her for hemorrhoids and did not see any externally.  She states that this is caused her a lot of worry and would like to be evaluated by GI since her symptoms come and go.  Has not noticed correlation with specific foods.    Patient has also noticed tingling in her third finger on the left hand.  She states has been going on for about 2 weeks now.  Denies any pain with movement of the finger.  She states she does not have a history of carpal tunnel, but does have wrist pain history.  Does been a lot of time on computers and her phone.  She denies any paralysis of the finger.  Denies any known injury or crushing injury to the finger.  She is able to feel, but states it feels \"tingly.\"    Subjective      Review of Systems:   Review of Systems   Constitutional:  Negative for chills and fever.   Respiratory:  Negative for cough, chest tightness and shortness of breath.    Cardiovascular:  Negative for chest pain and palpitations.   Gastrointestinal:  Positive for blood in stool, constipation and diarrhea. Negative for abdominal pain, nausea, rectal pain and vomiting.   Genitourinary:  Positive for frequency. Negative for decreased urine volume, hematuria, pelvic pain, pelvic pressure and urinary incontinence.   Neurological:  Positive for syncope and numbness. Negative for dizziness, facial asymmetry and light-headedness.   Psychiatric/Behavioral:  Positive for sleep disturbance and depressed mood. Negative for suicidal ideas. The patient is nervous/anxious.        Past Medical History:   Past Medical History:   Diagnosis Date    Anxiety     Depression     Urinary tract infection        Past Surgical History:   Past Surgical History:   Procedure Laterality Date    COMBINED LAMINECTOMY AND MICRODISECTOMY OF THORACIC / LUMBAR SPINE N/A        Family History:   Family History   Problem " "Relation Age of Onset    Diabetes Mother     Mental illness Mother        Social History:   Social History     Socioeconomic History    Marital status: Single   Tobacco Use    Smoking status: Never    Smokeless tobacco: Never   Vaping Use    Vaping status: Never Used   Substance and Sexual Activity    Alcohol use: Never    Drug use: Never    Sexual activity: Yes     Partners: Male     Birth control/protection: None       Medications:     Current Outpatient Medications:     busPIRone (BUSPAR) 5 MG tablet, Take 1 tablet by mouth 3 (Three) Times a Day., Disp: 90 tablet, Rfl: 1    PARoxetine (PAXIL) 20 MG tablet, Take 1 tablet by mouth Every Morning., Disp: 90 tablet, Rfl: 1    Allergies:   Allergies   Allergen Reactions    Venlafaxine Palpitations and Other (See Comments)     tachycardia    Pt states very back reaction, felt weird    Increased heart rate       Objective     Physical Exam:  Vital Signs:   Vitals:    07/02/24 0833   BP: 122/84   Pulse: 85   SpO2: 98%   Weight: 95.3 kg (210 lb 3.2 oz)   Height: 167.6 cm (66\")     Body mass index is 33.93 kg/m².         Physical Exam  Vitals reviewed.   Constitutional:       Appearance: Normal appearance.   HENT:      Head: Normocephalic and atraumatic.   Eyes:      Pupils: Pupils are equal, round, and reactive to light.   Cardiovascular:      Rate and Rhythm: Normal rate and regular rhythm.      Pulses: Normal pulses.      Heart sounds: Normal heart sounds.   Pulmonary:      Effort: Pulmonary effort is normal.      Breath sounds: Normal breath sounds.   Abdominal:      General: Abdomen is flat. Bowel sounds are normal.   Skin:     General: Skin is warm.   Neurological:      General: No focal deficit present.      Mental Status: She is alert and oriented to person, place, and time.      Motor: No weakness.      Coordination: Coordination normal.   Psychiatric:         Mood and Affect: Mood normal.             Results:   PHQ-9 Total Score: 24          Assessment / Plan  "     Assessment/Plan:   Diagnoses and all orders for this visit:    1. Anxiety (Primary)  -     Ambulatory Referral to Behavioral Health  -     Ambulatory Referral to Behavioral Health  -     busPIRone (BUSPAR) 5 MG tablet; Take 1 tablet by mouth 3 (Three) Times a Day.  Dispense: 90 tablet; Refill: 1  -     PARoxetine (PAXIL) 20 MG tablet; Take 1 tablet by mouth Every Morning.  Dispense: 90 tablet; Refill: 1    2. Panic disorder  -     Ambulatory Referral to Behavioral Health  -     Ambulatory Referral to Behavioral Health  -     busPIRone (BUSPAR) 5 MG tablet; Take 1 tablet by mouth 3 (Three) Times a Day.  Dispense: 90 tablet; Refill: 1  -     PARoxetine (PAXIL) 20 MG tablet; Take 1 tablet by mouth Every Morning.  Dispense: 90 tablet; Refill: 1  -     Vitamin B12; Future  -     Folate; Future  -     Ambulatory Referral to Neurology    3. Nocturnal polyuria  -     Cancel: Ambulatory Referral to Behavioral Health  -     Comprehensive Metabolic Panel; Future  -     CBC Auto Differential; Future  -     Hemoglobin A1c; Future  -     TSH; Future  -     T4, Free; Future  -     Ambulatory Referral to Urology    4. Syncope, unspecified syncope type  -     Ambulatory Referral to Neurology    5. Paresthesias  -     Comprehensive Metabolic Panel; Future  -     CBC Auto Differential; Future  -     Hemoglobin A1c; Future  -     TSH; Future  -     T4, Free; Future  -     Vitamin B12; Future  -     Folate; Future  -     Ambulatory Referral to Neurology    6. Carpal tunnel syndrome of left wrist    7. Blood in stool  -     Ambulatory Referral to Gastroenterology    Will evaluate patient's blood work today for possible causes of paresthesias.  Believe some of it may be related to anxiety.  Plan to increase Paxil to full 20 mg dose.  Patient to notify me of any new or worsening symptoms with this increase in dose.  I have also placed referrals to behavioral health for med management and talk therapy at patient's request.  Have also  ordered blood work today for possible causes of her nocturnal urination.  I have advised her to stop drinking water at least 4 hours before bedtime to avoid having to get up in the night.  Believe that this will likely help her stay asleep and have better rest.  With her complaint of recurrent UTIs have placed referral to urology as well.  Can continue to stay off of Macrobid at this point since her culture was negative.  I have placed referral to neurology for syncopal episodes and tingling scalp sensation to rule out seizure activity.  Believe this may likely also be related to anxiety, but with her symptoms continuing to happen believe it is likely good to evaluate further.  Believe patient may likely have carpal tunnel syndrome in her left hand due to the presentation of the tingling sensation.  I did recommend buying a brace to wear throughout the day to help to alleviate pressure from the median nerve.  Discussed with her that if her symptoms persist or worsen that we can consider EMG or nerve conduction study in the future.  She verbalized understanding and agreed to this plan.      Follow Up:   Return in about 3 months (around 10/2/2024) for Annual physical w/ pap .    Catrina Moore PA-C   [facility]

## 2024-07-05 ENCOUNTER — PATIENT ROUNDING (BHMG ONLY) (OUTPATIENT)
Dept: FAMILY MEDICINE CLINIC | Facility: CLINIC | Age: 29
End: 2024-07-05
Payer: MEDICAID

## 2024-11-14 ENCOUNTER — OFFICE VISIT (OUTPATIENT)
Dept: FAMILY MEDICINE CLINIC | Facility: CLINIC | Age: 29
End: 2024-11-14
Payer: MEDICAID

## 2024-11-14 VITALS
HEIGHT: 66 IN | HEART RATE: 64 BPM | WEIGHT: 223.6 LBS | DIASTOLIC BLOOD PRESSURE: 70 MMHG | SYSTOLIC BLOOD PRESSURE: 112 MMHG | OXYGEN SATURATION: 98 % | BODY MASS INDEX: 35.93 KG/M2

## 2024-11-14 DIAGNOSIS — Z00.00 ANNUAL PHYSICAL EXAM: Primary | ICD-10-CM

## 2024-11-14 DIAGNOSIS — Z12.4 PAP SMEAR FOR CERVICAL CANCER SCREENING: ICD-10-CM

## 2024-11-14 DIAGNOSIS — Z30.09 COUNSELING FOR INITIATION OF BIRTH CONTROL METHOD: ICD-10-CM

## 2024-11-14 DIAGNOSIS — Z23 NEEDS FLU SHOT: ICD-10-CM

## 2024-11-14 LAB
B-HCG UR QL: NEGATIVE
EXPIRATION DATE: NORMAL
INTERNAL NEGATIVE CONTROL: NEGATIVE
INTERNAL POSITIVE CONTROL: POSITIVE
Lab: NORMAL

## 2024-11-14 RX ORDER — NORETHINDRONE ACETATE AND ETHINYL ESTRADIOL 1MG-20(21)
1 KIT ORAL DAILY
Qty: 28 TABLET | Refills: 12 | Status: SHIPPED | OUTPATIENT
Start: 2024-11-14 | End: 2025-11-14

## 2024-11-14 RX ORDER — NORETHINDRONE ACETATE AND ETHINYL ESTRADIOL 1MG-20(21)
1 KIT ORAL DAILY
Qty: 28 TABLET | Refills: 12 | Status: CANCELLED | OUTPATIENT
Start: 2024-11-14 | End: 2025-11-14

## 2024-11-14 NOTE — PROGRESS NOTES
"     Female Physical Note      Date: 2024   Patient Name: Caitlin Chaves  : 1995   MRN: [id number]     Chief Complaint:    Chief Complaint   Patient presents with    Annual Exam     Physical with pap and discuss birth control   Chronic pain from back surgery from herniated disk        History of Present Illness: Caitlin Chaves is a 29 y.o. adult who is here today for their annual health maintenance and physical.       Would like to discuss birth control counseling. Has previously been on oral contraceptives. At that time she was in a bad emotional relationship and was struggling mentally. She felt the birth control at that time was effecting her mood. She now wishes to go back on birth control as she feels that the mood changes were more related to that situation. She does not remember the one she was on. Only interested in OCP, no IUD or Nuvaring or progesterone injections. She states she would like OCP for prevention of pregnancy. Menstrual cycles are regular. No heavy bleeding or pain. Lasts 5-6 days.  Denies history of migraines or blood clots or hypercoagulable state. States she will ocassionally \"smoke\" but does not smoke cigarettes. Does not share what she smokes.  Her LMP started 10/20. Denies any intermenstrual bleeding or pain with intercourse. Denies abdominal pain or bloating. Currently uses condoms for prevention of pregnancy. Would like STI and infection screening added to pap today. No symptoms currently.   Denies abnormal pap screenings in the past.     She requests her flu shot today.   Declines all other vaccines today.    Subjective      Review of Systems:   Review of Systems   Constitutional:  Negative for appetite change, chills, fatigue and fever.   Respiratory:  Negative for chest tightness and shortness of breath.    Cardiovascular:  Negative for chest pain and palpitations.   Gastrointestinal:  Negative for abdominal pain.   Neurological:  Negative for dizziness and " light-headedness.       Past Medical History, Social History, Family History and Care Team were all reviewed with patient and updated as appropriate.     Medications:     Current Outpatient Medications:     busPIRone (BUSPAR) 5 MG tablet, Take 1 tablet by mouth 3 (Three) Times a Day., Disp: 90 tablet, Rfl: 1    PARoxetine (PAXIL) 20 MG tablet, Take 1 tablet by mouth Every Morning., Disp: 90 tablet, Rfl: 1    norethindrone-ethinyl estradiol FE (Junel FE 1/20) 1-20 MG-MCG per tablet, Take 1 tablet by mouth Daily., Disp: 28 tablet, Rfl: 12    Allergies:   Allergies   Allergen Reactions    Venlafaxine Palpitations and Other (See Comments)     tachycardia    Pt states very back reaction, felt weird    Increased heart rate       Immunizations:  Td/Tdap(Booster Q 10 yrs):  Declined   Flu (Yearly):  Clinic out of stock. Patient to return.     Pap:  Updated today.   Last Completed Pap Smear       This patient has no relevant Health Maintenance data.             Hep C ( 6275-7104):  negative      Diet/Physical activity:  -She works a lot on her feet. no activity outside of this  -Tried stretching and yoga, but feels she gets too tired once she gets home.  -Goes through phases with diet, been trying to eat better recently.   -Drinks mostly water. Does like teas and juices.       Sexual Health:    Sexually active with partner   No concern for STI, but would like added to pap.   Use condoms. Interested in starting birth control.   LMP was 10/20.     PHQ-2 Depression Screening  Little interest or pleasure in doing things?  0   Feeling down, depressed, or hopeless?  0   PHQ-2 Total Score  0           Intimate partner violence: (Screen on initial visit, pregnant women, women with injuries, older adult with injury or evidence of neglect):  Violence can be a problem in many people's lives, so I now ask every patient about trauma or abuse they may have experienced in a relationship.  Stress/Safety - Do you feel safe in your  "relationship?  Afraid/Abused - Have you ever been in a relationship where you were threatened, hurt, or afraid?  Friend/Family - Are your friends aware you have been hurt?  Emergency Plan - Do you have a safe place to go and the resources you need in an emergency?    Osteoporosis:   Ost menopausal women < 65 with RF (advancing age, previous fracture, glucocorticoid therapy, parental hip fracture, low body weight, current cigarette smoking, excessive alcohol consumption, rheumatoid arthritis, secondary osteoporosis [hypogonadism/premature menopause, malabsorption, chronic liver disease, IBD]).  All women 65 or older    Objective     Physical Exam:  Vital Signs:   Vitals:    11/14/24 0851   BP: 112/70   Pulse: 64   SpO2: 98%   Weight: 101 kg (223 lb 9.6 oz)   Height: 167.6 cm (65.98\")   PainSc:   4   PainLoc: Back            Physical Exam  Vitals reviewed. Exam conducted with a chaperone present.   Constitutional:       General: Caitlin is not in acute distress.     Appearance: Normal appearance.   HENT:      Head: Normocephalic and atraumatic.   Eyes:      Extraocular Movements: Extraocular movements intact.      Pupils: Pupils are equal, round, and reactive to light.   Cardiovascular:      Rate and Rhythm: Normal rate and regular rhythm.      Pulses: Normal pulses.      Heart sounds: Normal heart sounds.   Pulmonary:      Effort: Pulmonary effort is normal.      Breath sounds: Normal breath sounds.   Chest:   Breasts:     Breasts are symmetrical.      Right: Normal.      Left: Normal.      Comments: Negative for lymphadenopathy.   Abdominal:      General: Abdomen is flat. Bowel sounds are normal.      Tenderness: There is no abdominal tenderness. There is no guarding or rebound.   Genitourinary:     General: Normal vulva.      Exam position: Lithotomy position.      Vagina: Normal.      Cervix: Normal.      Rectum: Normal.   Skin:     General: Skin is warm.   Neurological:      General: No focal deficit present.      " Mental Status: Caitlin is alert and oriented to person, place, and time.   Psychiatric:         Mood and Affect: Mood normal.             Assessment / Plan      Assessment/Plan:   Diagnoses and all orders for this visit:    1. Annual physical exam (Primary)  -     Cancel: LIQUID-BASED PAP SMEAR WITH HPV GENOTYPING IF ASCUS (HARLEEN,COR,MAD); Future  -     LIQUID-BASED PAP SMEAR WITH HPV GENOTYPING IF ASCUS (HARLEEN,COR,MAD); Future    2. Counseling for initiation of birth control method  Assessment & Plan:  Patient wishes to pursue oral contraceptives for protection against pregnancy.  Counseled on OCP, NuvaRing, injections and IUD.  Discussed potential risks of blood clots with smoking.  Discussed signs and symptoms of blood clots.  Patient would like to move forward with oral contraceptive at this time.    Orders:  -     POCT pregnancy, urine  -     norethindrone-ethinyl estradiol FE (Junel FE 1/20) 1-20 MG-MCG per tablet; Take 1 tablet by mouth Daily.  Dispense: 28 tablet; Refill: 12    3. Pap smear for cervical cancer screening  -     Cancel: LIQUID-BASED PAP SMEAR WITH HPV GENOTYPING IF ASCUS ([facility]ARLEEN,COR,MAD); Future  -     LIQUID-BASED PAP SMEAR WITH HPV GENOTYPING IF ASCUS (HARLEEN,COR,MAD); Future    4. Needs flu shot  -     Cancel: Fluzone >6mos ([id number])    Patient recently had blood work done within normal limits.   Updated pap screening today. Exam WNL.Will notify patient of results.   Office is out of flu shot today. Patient to return at later time for completion in nurse visit.   Negative urine pregnancy test. Patient counseled on oral birth control. Discussed risk of thromboembolism with combination smoking and OCP. Encouraged smoking cessation while on OCP. Discussed symptoms of this and urged to seek immediate eval in ER. Advised to start pack on first Sunday following her menstrual cycle. Used additional protection with condoms for at least the first week following initiation. Counseled on importance of taking  pill at the same time every day for most effectiveness. Discussed missed dosing. Birth control does not protect against STI, use condoms to protect against transmission. Patient verbalized understanding and agreed to plan.          Follow Up:   Return in about 6 months (around 5/14/2025).    Healthcare Maintenance:   Counseling provided on recommended vaccinations, age-appropriate cancer screenings including Pap smear.  Patient would like to get her flu shot today, but office is out.  Patient encouraged to return later for nurse visit.  Declined tetanus booster today.  Encouraged COVID-19 booster through her pharmacy.  Pap smear was completed.  Patient recently had lab work done that was within normal limits. Nutrition and activity goals reviewed including: mainly water to drink, limit white flour/processed sugar, and processed foods, choose fresh fruits, vegetables, fish, lean meats,high fiber carbs, exercise  working toward 150 mins cardio per week, weight training 2x/week.    Caitlin Chaves voices understanding and acceptance of this advice and will call back with any further questions or concerns. AVS with preventive healthcare tips printed for patient.     Catrina Moore PA-C   [facility] [place] [address]

## 2024-11-14 NOTE — ASSESSMENT & PLAN NOTE
Patient wishes to pursue oral contraceptives for protection against pregnancy.  Counseled on OCP, NuvaRing, injections and IUD.  Discussed potential risks of blood clots with smoking.  Discussed signs and symptoms of blood clots.  Patient would like to move forward with oral contraceptive at this time.

## 2024-11-18 ENCOUNTER — TELEPHONE (OUTPATIENT)
Dept: FAMILY MEDICINE CLINIC | Facility: CLINIC | Age: 29
End: 2024-11-18
Payer: MEDICAID

## 2024-11-18 DIAGNOSIS — Z11.3 ROUTINE SCREENING FOR STI (SEXUALLY TRANSMITTED INFECTION): Primary | ICD-10-CM

## 2024-11-18 LAB — REF LAB TEST METHOD: NORMAL

## 2024-11-18 NOTE — TELEPHONE ENCOUNTER
"ALIDA [facility] CALLED TO REQUEST WHAT KIND OF STI LABS NEEDED TO BE RAN ON PATIENTS PAP LAB, REACHED OUT TO PROVIDER WHO STATED SHE WOULD PUT IN ANOTHER ORDER FOR \"gonorrhea, chladmydia and tric\". I CALLED BACK TO INFORM ALIDA BUT LEFT A VOICEMAIL. HUB TO RELAY AND DOCUMENT IF NEEDED.   "

## 2024-11-18 NOTE — TELEPHONE ENCOUNTER
Torsten Meyer : we were unable to add her STI screening to pap for some reason according to the lab. can we let her know I can place order for her to have this completed at the lab

## 2024-11-24 DIAGNOSIS — F41.9 ANXIETY: ICD-10-CM

## 2024-11-24 DIAGNOSIS — F41.0 PANIC DISORDER: ICD-10-CM

## 2024-11-25 RX ORDER — PAROXETINE 20 MG/1
20 TABLET, FILM COATED ORAL EVERY MORNING
Qty: 90 TABLET | Refills: 1 | Status: SHIPPED | OUTPATIENT
Start: 2024-11-25

## 2024-11-25 RX ORDER — BUSPIRONE HYDROCHLORIDE 5 MG/1
5 TABLET ORAL 3 TIMES DAILY
Qty: 90 TABLET | Refills: 1 | Status: SHIPPED | OUTPATIENT
Start: 2024-11-25

## 2024-11-25 NOTE — TELEPHONE ENCOUNTER
Rx Refill Note  Requested Prescriptions     Pending Prescriptions Disp Refills    busPIRone (BUSPAR) 5 MG tablet 90 tablet 1     Sig: Take 1 tablet by mouth 3 (Three) Times a Day.    PARoxetine (PAXIL) 20 MG tablet 90 tablet 1     Sig: Take 1 tablet by mouth Every Morning.      Last office visit with prescribing clinician: 11/14/2024   Last telemedicine visit with prescribing clinician: Visit date not found   Next office visit with prescribing clinician:-    Kathleen Palencia MA  11/25/24, 10:34 EST

## 2025-01-07 ENCOUNTER — TELEMEDICINE (OUTPATIENT)
Dept: FAMILY MEDICINE CLINIC | Facility: TELEHEALTH | Age: 30
End: 2025-01-07
Payer: COMMERCIAL

## 2025-01-07 DIAGNOSIS — J06.9 UPPER RESPIRATORY TRACT INFECTION, UNSPECIFIED TYPE: Primary | ICD-10-CM

## 2025-01-07 DIAGNOSIS — S99.919A ANKLE INJURY, UNSPECIFIED LATERALITY, INITIAL ENCOUNTER: ICD-10-CM

## 2025-01-07 PROCEDURE — 99213 OFFICE O/P EST LOW 20 MIN: CPT | Performed by: NURSE PRACTITIONER

## 2025-01-08 NOTE — PROGRESS NOTES
You have chosen to receive care through a telehealth visit.  Do you consent to use a video/audio connection for your medical care today? Yes     HPI  Caitlin Chaves is a 29 y.o. adult  presents with complaint of 4 day h/o cough and sinus pressure and cold like symptoms she is taking OTC medication and is without fever. She also reports falling off a step and hurting her ankle. She does not think it is fractured. She reports the pain is less severe than prior ankle injuries.     Review of Systems   Constitutional: Negative.    HENT:  Positive for postnasal drip, sneezing and sore throat.    Respiratory:  Positive for cough.        Past Medical History:   Diagnosis Date    Anxiety     Depression     Low back pain     Urinary tract infection        Family History   Problem Relation Age of Onset    Diabetes Mother         Partum diabetes    Mental illness Mother     Anxiety disorder Mother     Arthritis Mother     Vision loss Maternal Grandmother        Social History     Socioeconomic History    Marital status: Single   Tobacco Use    Smoking status: Never    Smokeless tobacco: Never   Vaping Use    Vaping status: Never Used   Substance and Sexual Activity    Alcohol use: Never    Drug use: Never    Sexual activity: Yes     Partners: Male     Birth control/protection: Condom, None         LMP 12/20/2024     PHYSICAL EXAM  Physical Exam   Constitutional: Caitlin is oriented to person, place, and time. Caitlin appears well-developed and well-nourished. Caitlin does not have a sickly appearance. Caitlin does not appear ill. No distress.   HENT:   Head: Normocephalic and atraumatic.   Nose: Rhinorrhea present. Right sinus exhibits no maxillary sinus tenderness and no frontal sinus tenderness. Left sinus exhibits no maxillary sinus tenderness and no frontal sinus tenderness.   Mouth/Throat: Mouth/Lips are normal.  Pulmonary/Chest: Effort normal.  No respiratory distress.  Neurological: Caitlin is alert and oriented to person,  place, and time.   Psychiatric: Caitlin has a normal mood and affect.   Vitals reviewed.      Diagnoses and all orders for this visit:    1. Upper respiratory tract infection, unspecified type (Primary)    2. Ankle injury, unspecified laterality, initial encounter    RICE for ankle inury.   IBU prn every 6 hours ankel pain  Follow up at [facility] ED prn worsening s/s    Mucinex DM as directed prn cough.   Dayquil and Nyquil as directed  prn cold symptoms   Increase fluids and rest.       FOLLOW-UP  As discussed during visit with [facility], if symptoms worsen or fail to improve, follow-up with PCP/Urgent Care/Emergency Department.    Patient verbalizes understanding of medications, instructions for treatment and follow-up.    Perla Foley, APRN  01/07/2025  20:00 EST    The use of a video visit has been reviewed with the patient and verbal informed consent has been obtained. Myself and Caitlinmirza Chaves participated in this visit. The patient is located in [facility], and I am located in [place], [place]. [employer] and [employer]  were utilized.

## 2025-02-17 DIAGNOSIS — F41.9 ANXIETY: ICD-10-CM

## 2025-02-17 DIAGNOSIS — F41.0 PANIC DISORDER: ICD-10-CM

## 2025-02-17 DIAGNOSIS — Z30.09 COUNSELING FOR INITIATION OF BIRTH CONTROL METHOD: ICD-10-CM

## 2025-02-17 RX ORDER — BUSPIRONE HYDROCHLORIDE 5 MG/1
5 TABLET ORAL 3 TIMES DAILY
Qty: 270 TABLET | Refills: 0 | Status: SHIPPED | OUTPATIENT
Start: 2025-02-17

## 2025-02-17 RX ORDER — NORETHINDRONE ACETATE AND ETHINYL ESTRADIOL 1MG-20(21)
1 KIT ORAL DAILY
Qty: 28 TABLET | Refills: 12 | OUTPATIENT
Start: 2025-02-17 | End: 2026-02-17

## 2025-02-17 RX ORDER — PAROXETINE 20 MG/1
20 TABLET, FILM COATED ORAL EVERY MORNING
Qty: 90 TABLET | Refills: 1 | Status: SHIPPED | OUTPATIENT
Start: 2025-02-17

## 2025-02-25 DIAGNOSIS — Z30.09 COUNSELING FOR INITIATION OF BIRTH CONTROL METHOD: ICD-10-CM

## 2025-02-26 RX ORDER — NORETHINDRONE ACETATE AND ETHINYL ESTRADIOL 1MG-20(21)
1 KIT ORAL DAILY
Qty: 28 TABLET | Refills: 12 | OUTPATIENT
Start: 2025-02-26 | End: 2026-02-26

## 2025-05-18 DIAGNOSIS — Z30.09 COUNSELING FOR INITIATION OF BIRTH CONTROL METHOD: ICD-10-CM

## 2025-05-19 RX ORDER — NORETHINDRONE ACETATE AND ETHINYL ESTRADIOL 1MG-20(21)
1 KIT ORAL DAILY
Qty: 28 TABLET | Refills: 2 | Status: SHIPPED | OUTPATIENT
Start: 2025-05-19 | End: 2026-05-19

## 2025-06-05 ENCOUNTER — HOSPITAL ENCOUNTER (OUTPATIENT)
Dept: GENERAL RADIOLOGY | Facility: HOSPITAL | Age: 30
Discharge: HOME | End: 2025-06-05
Payer: COMMERCIAL

## 2025-06-05 ENCOUNTER — RESULTS FOLLOW-UP (OUTPATIENT)
Dept: FAMILY MEDICINE CLINIC | Facility: CLINIC | Age: 30
End: 2025-06-05

## 2025-06-05 ENCOUNTER — OFFICE VISIT (OUTPATIENT)
Dept: FAMILY MEDICINE CLINIC | Facility: CLINIC | Age: 30
End: 2025-06-05
Payer: COMMERCIAL

## 2025-06-05 VITALS
HEART RATE: 62 BPM | OXYGEN SATURATION: 98 % | DIASTOLIC BLOOD PRESSURE: 88 MMHG | TEMPERATURE: 98.6 F | HEIGHT: 66 IN | BODY MASS INDEX: 38.7 KG/M2 | SYSTOLIC BLOOD PRESSURE: 136 MMHG | WEIGHT: 240.8 LBS

## 2025-06-05 DIAGNOSIS — M54.42 ACUTE LEFT-SIDED LOW BACK PAIN WITH LEFT-SIDED SCIATICA: Primary | ICD-10-CM

## 2025-06-05 DIAGNOSIS — M54.42 ACUTE LEFT-SIDED LOW BACK PAIN WITH LEFT-SIDED SCIATICA: ICD-10-CM

## 2025-06-05 DIAGNOSIS — L72.9 SCALP CYST: ICD-10-CM

## 2025-06-05 DIAGNOSIS — F41.9 ANXIETY: ICD-10-CM

## 2025-06-05 PROCEDURE — 99214 OFFICE O/P EST MOD 30 MIN: CPT

## 2025-06-05 PROCEDURE — 72100 X-RAY EXAM L-S SPINE 2/3 VWS: CPT

## 2025-06-05 RX ORDER — METHOCARBAMOL 500 MG/1
500 TABLET, FILM COATED ORAL 4 TIMES DAILY
Qty: 40 TABLET | Refills: 0 | Status: SHIPPED | OUTPATIENT
Start: 2025-06-05 | End: 2025-06-05

## 2025-06-05 RX ORDER — METHOCARBAMOL 500 MG/1
500 TABLET, FILM COATED ORAL NIGHTLY PRN
Qty: 30 TABLET | Refills: 0 | Status: SHIPPED | OUTPATIENT
Start: 2025-06-05

## 2025-06-05 NOTE — PROGRESS NOTES
Office Note     Name: Caitlin Chaves    : 1995     MRN: [id number]     Chief Complaint  Back Pain (Lower back feels constantly irritated and chronic pain is starting from lower back with a sharp pain and felt in back of lower thigh and a bit of upper calf. Gluten muscle feels like it keeps tightening with no hardly any ease (gradually gets worse throughout day). Pain has gotten to a 6 or 7 out of 10. Since work accident in 2017)    Subjective     History of Present Illness:  Caitlin Chaves is a 30 y.o. adult who presents today for complaints of low back pain.  Patient states she had a laminectomy and microdiscectomy of her thoracic and lumbar spine in .  Patient states that time before the surgery most of her symptoms were on the right side.  She has not had any symptoms since then.  Patient states now in the past when she has been experiencing pain on the left low back.  She denies any recent injuries or falls.  Does notice that her pain will radiate from the left buttocks down into the posterior aspect of left leg.  Denies any muscle weakness.  She does state that she has tight muscles of her low back and buttocks that feels better to press on.  She states that sometimes the pain will be a 6 6 or 7 out of 10, but today feels well.  Feels like her pain worsens throughout the day.  Currently using ibuprofen as needed.  She will sometimes ice her low back as well.  Denies saddle anesthesias, loss of bowel or bladder control.  Denies fevers or chills. Denies gait changes, numbness or tingling.  Denies urinary symptoms.    Anxiety:  She is currently Buspar 3x daily and Paxil 20 mg daily as well.  States her symptoms are well-controlled at this time.  Denies SI or HI.    Pilar cyst:   Patient has small pilar cysts on her scalp and has had them since she was in high school.  She states multiple family members have them as well.  She is interested in having them removed.  She states they are not  painful and does not think they have changed.  Denies headache, vision changes, dizziness.      Review of Systems:   Review of Systems   Constitutional:  Negative for chills and fever.   Eyes:  Negative for visual disturbance.   Respiratory:  Negative for chest tightness and shortness of breath.    Cardiovascular:  Negative for chest pain and palpitations.   Gastrointestinal:  Negative for abdominal pain.   Musculoskeletal:  Positive for back pain.   Skin:  Positive for skin lesions.   Neurological:  Negative for dizziness, weakness, light-headedness and numbness.       Past Medical History:   Past Medical History:   Diagnosis Date   • Anxiety    • Depression    • Low back pain    • Urinary tract infection        Past Surgical History:   Past Surgical History:   Procedure Laterality Date   • COMBINED LAMINECTOMY AND MICRODISECTOMY OF THORACIC / LUMBAR SPINE N/A    • SPINE SURGERY      Microdiscectomy       Family History:   Family History   Problem Relation Age of Onset   • Diabetes Mother         Partum diabetes   • Mental illness Mother    • Anxiety disorder Mother    • Arthritis Mother    • Vision loss Maternal Grandmother        Social History:   Social History     Socioeconomic History   • Marital status: Single   Tobacco Use   • Smoking status: Never   • Smokeless tobacco: Never   Vaping Use   • Vaping status: Never Used   Substance and Sexual Activity   • Alcohol use: Never   • Drug use: Never   • Sexual activity: Yes     Partners: Male     Birth control/protection: Condom, None       Immunizations:   Immunization History   Administered Date(s) Administered   • COVID-19 (UNSPECIFIED) 07/07/2021   • DTaP, Unspecified 08/12/1999   • MMR 08/12/1999   • OPV 08/12/1999        Medications:     Current Outpatient Medications:   •  busPIRone (BUSPAR) 5 MG tablet, Take 1 tablet by mouth 3 (Three) Times a Day., Disp: 270 tablet, Rfl: 0  •  norethindrone-ethinyl estradiol FE (Junel FE 1/20) 1-20 MG-MCG per tablet, Take  "1 tablet by mouth Daily., Disp: 28 tablet, Rfl: 2  •  PARoxetine (PAXIL) 20 MG tablet, Take 1 tablet by mouth Every Morning., Disp: 90 tablet, Rfl: 1  •  methocarbamol (ROBAXIN) 500 MG tablet, Take 1 tablet by mouth At Night As Needed for Muscle Spasms., Disp: 30 tablet, Rfl: 0    Allergies:   Allergies   Allergen Reactions   • Venlafaxine Palpitations and Other (See Comments)     tachycardia    Pt states very back reaction, felt weird    Increased heart rate       Objective     Vital Signs  /88 (BP Location: Right arm, Patient Position: Sitting, Cuff Size: Adult)   Pulse 62   Temp 98.6 °F (37 °C) (Oral)   Ht 167.6 cm (66\")   Wt 109 kg (240 lb 12.8 oz)   SpO2 98%   BMI 38.87 kg/m²   Estimated body mass index is 38.87 kg/m² as calculated from the following:    Height as of this encounter: 167.6 cm (66\").    Weight as of this encounter: 109 kg (240 lb 12.8 oz).           Physical Exam  Vitals reviewed.   Constitutional:       General: Caitlin is not in acute distress.     Appearance: Normal appearance. Caitlin is obese.   HENT:      Head: Normocephalic and atraumatic.      Comments: Two small cyst lesions to top of scalp. No red or tender to touch.      Nose: Nose normal.      Mouth/Throat:      Mouth: Mucous membranes are moist.   Eyes:      Extraocular Movements: Extraocular movements intact.      Pupils: Pupils are equal, round, and reactive to light.   Cardiovascular:      Rate and Rhythm: Normal rate and regular rhythm.      Pulses: Normal pulses.      Heart sounds: Normal heart sounds.   Pulmonary:      Effort: Pulmonary effort is normal. No respiratory distress.      Breath sounds: Normal breath sounds. No wheezing, rhonchi or rales.   Abdominal:      General: Abdomen is flat. Bowel sounds are normal.   Musculoskeletal:      Cervical back: Normal range of motion.      Lumbar back: Spasms present. No tenderness or bony tenderness.   Lymphadenopathy:      Cervical: No cervical adenopathy.   Skin:     " General: Skin is warm.   Neurological:      General: No focal deficit present.      Mental Status: Caitlin is alert and oriented to person, place, and time.   Psychiatric:         Mood and Affect: Mood normal.            Results:  No results found for this or any previous visit (from the past 24 hours).     Assessment and Plan     Assessment/Plan:  Diagnoses and all orders for this visit:    1. Acute left-sided low back pain with left-sided sciatica (Primary)  Assessment & Plan:  Clinically sounds like sciatica.   Ongoing for the past 2 months.  No red flag signs.  No recent injury. no spinal tenderness.  History of spinal surgery.    Will obtain new lumbar xray.    Symptoms are not persistent at this time but more intermittent depending on activity.  She does have muscle spasms of her paraspinal muscles left lumbar region.    Will trial Robaxin to take as needed at nighttime for muscle spasms.  Continue ibuprofen as needed as well.    Physical therapy referral placed.      Orders:  -     XR Spine Lumbar 2 or 3 View; Future  -     Ambulatory Referral to Physical Therapy for Evaluation & Treatment  -     Discontinue: methocarbamol (ROBAXIN) 500 MG tablet; Take 1 tablet by mouth 4 (Four) Times a Day.  Dispense: 40 tablet; Refill: 0  -     methocarbamol (ROBAXIN) 500 MG tablet; Take 1 tablet by mouth At Night As Needed for Muscle Spasms.  Dispense: 30 tablet; Refill: 0    2. Anxiety  Assessment & Plan:  Well-controlled on Paxil.  Continue.  Counseled on lifestyle modifications.      3. Scalp cyst  Assessment & Plan:  Lesions on her scalp do appear to be cystic-like.  Mobile.  Nontender.  Does not appear to be infected.  Will refer to dermatology for further evaluation.    Orders:  -     Ambulatory Referral to Dermatology    Patient to notify me of any changes or lack of improvement.   Plan of care reviewed with the patient at the conclusion of today's visit.  Education was provided regarding diagnosis and management.   Patient verbalizes understanding of and agreement with plan.   Dictated Utilizing Dragon Dictation     Please note that portions of this note were completed with a voice recognition program.     Part of this note may be an electronic transcription/translation of spoken language to printed text using the Dragon Dictation System.     Follow Up  Return in about 3 months (around 9/5/2025) for Recheck/est new pcp .    Catrina Moore PA-C   [facility] [place] [facility]

## 2025-06-05 NOTE — PROGRESS NOTES
Office Note     Name: Caitlin Chaves    : 1995     MRN: [id number]     Chief Complaint  No chief complaint on file.    Subjective     History of Present Illness:  Caitlin Chaves is a 30 y.o. adult who presents today for ***    Review of Systems:   Review of Systems    Past Medical History:   Past Medical History:   Diagnosis Date    Anxiety     Depression     Low back pain     Urinary tract infection        Past Surgical History:   Past Surgical History:   Procedure Laterality Date    COMBINED LAMINECTOMY AND MICRODISECTOMY OF THORACIC / LUMBAR SPINE N/A     SPINE SURGERY      Microdiscectomy       Family History:   Family History   Problem Relation Age of Onset    Diabetes Mother         Partum diabetes    Mental illness Mother     Anxiety disorder Mother     Arthritis Mother     Vision loss Maternal Grandmother        Social History:   Social History     Socioeconomic History    Marital status: Single   Tobacco Use    Smoking status: Never    Smokeless tobacco: Never   Vaping Use    Vaping status: Never Used   Substance and Sexual Activity    Alcohol use: Never    Drug use: Never    Sexual activity: Yes     Partners: Male     Birth control/protection: Condom, None       Immunizations:   Immunization History   Administered Date(s) Administered    COVID-19 (UNSPECIFIED) 2021    DTaP, Unspecified 1999    MMR 1999    OPV 1999        Medications:     Current Outpatient Medications:     busPIRone (BUSPAR) 5 MG tablet, Take 1 tablet by mouth 3 (Three) Times a Day., Disp: 270 tablet, Rfl: 0    naproxen (NAPROSYN) 500 MG tablet, Take 1 tablet by mouth 2 (Two) Times a Day As Needed for Mild Pain or Moderate Pain., Disp: 15 tablet, Rfl: 0    norethindrone-ethinyl estradiol FE (Junel FE ) 1-20 MG-MCG per tablet, Take 1 tablet by mouth Daily., Disp: 28 tablet, Rfl: 2    PARoxetine (PAXIL) 20 MG tablet, Take 1 tablet by mouth Every Morning., Disp: 90 tablet, Rfl: 1    Allergies:  "  Allergies   Allergen Reactions    Venlafaxine Palpitations and Other (See Comments)     tachycardia    Pt states very back reaction, felt weird    Increased heart rate       Objective     Vital Signs  There were no vitals taken for this visit.  Estimated body mass index is 34.7 kg/m² as calculated from the following:    Height as of 1/7/25: 167.6 cm (66\").    Weight as of 1/7/25: 97.5 kg (215 lb).    {BMI is >= 30 and <35. (Class 1 Obesity). The following options were offered after discussion; (Optional):08516}       Physical Exam   ***    Procedures     Results:  No results found for this or any previous visit (from the past 24 hours).     Assessment and Plan     Assessment/Plan:  There are no diagnoses linked to this encounter.    Follow Up  No follow-ups on file.    Catrina Moore PA-C   [facility] [place] [facility]  "

## 2025-06-12 PROBLEM — L72.9 SCALP CYST: Status: ACTIVE | Noted: 2025-06-12

## 2025-06-12 PROBLEM — M54.42 ACUTE LEFT-SIDED LOW BACK PAIN WITH LEFT-SIDED SCIATICA: Status: ACTIVE | Noted: 2025-06-12

## 2025-06-12 NOTE — ASSESSMENT & PLAN NOTE
Clinically sounds like sciatica.   Ongoing for the past 2 months.  No red flag signs.  No recent injury. no spinal tenderness.  History of spinal surgery.    Will obtain new lumbar xray.    Symptoms are not persistent at this time but more intermittent depending on activity.  She does have muscle spasms of her paraspinal muscles left lumbar region.    Will trial Robaxin to take as needed at nighttime for muscle spasms.  Continue ibuprofen as needed as well.    Physical therapy referral placed.

## 2025-06-12 NOTE — ASSESSMENT & PLAN NOTE
Lesions on her scalp do appear to be cystic-like.  Mobile.  Nontender.  Does not appear to be infected.  Will refer to dermatology for further evaluation.